# Patient Record
Sex: FEMALE | Race: BLACK OR AFRICAN AMERICAN | NOT HISPANIC OR LATINO | URBAN - METROPOLITAN AREA
[De-identification: names, ages, dates, MRNs, and addresses within clinical notes are randomized per-mention and may not be internally consistent; named-entity substitution may affect disease eponyms.]

---

## 2023-07-10 ENCOUNTER — EMERGENCY (EMERGENCY)
Facility: HOSPITAL | Age: 88
LOS: 0 days | Discharge: HOME | End: 2023-07-11
Attending: STUDENT IN AN ORGANIZED HEALTH CARE EDUCATION/TRAINING PROGRAM
Payer: MEDICARE

## 2023-07-10 VITALS
RESPIRATION RATE: 18 BRPM | SYSTOLIC BLOOD PRESSURE: 205 MMHG | WEIGHT: 179.02 LBS | OXYGEN SATURATION: 99 % | HEIGHT: 62 IN | DIASTOLIC BLOOD PRESSURE: 105 MMHG | HEART RATE: 91 BPM | TEMPERATURE: 99 F

## 2023-07-10 DIAGNOSIS — I10 ESSENTIAL (PRIMARY) HYPERTENSION: ICD-10-CM
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
## 2023-07-10 DIAGNOSIS — G89.29 OTHER CHRONIC PAIN: ICD-10-CM

## 2023-07-10 DIAGNOSIS — M25.511 PAIN IN RIGHT SHOULDER: ICD-10-CM

## 2023-07-10 DIAGNOSIS — M25.512 PAIN IN LEFT SHOULDER: ICD-10-CM

## 2023-07-10 DIAGNOSIS — M54.2 CERVICALGIA: ICD-10-CM
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
## 2023-07-10 DIAGNOSIS — H92.01 OTALGIA, RIGHT EAR: ICD-10-CM

## 2023-07-10 LAB
BASOPHILS # BLD AUTO: 0.03 K/UL — SIGNIFICANT CHANGE UP (ref 0–0.2)
BASOPHILS NFR BLD AUTO: 0.4 % — SIGNIFICANT CHANGE UP (ref 0–2)
EOSINOPHIL # BLD AUTO: 0.48 K/UL — SIGNIFICANT CHANGE UP (ref 0–0.5)
EOSINOPHIL NFR BLD AUTO: 6.8 % — HIGH (ref 0–6)
HCT VFR BLD CALC: 35.7 % — SIGNIFICANT CHANGE UP (ref 34.5–45)
HGB BLD-MCNC: 11.3 G/DL — LOW (ref 11.5–15.5)
IMM GRANULOCYTES NFR BLD AUTO: 0.3 % — SIGNIFICANT CHANGE UP (ref 0–0.9)
LYMPHOCYTES # BLD AUTO: 1.23 K/UL — SIGNIFICANT CHANGE UP (ref 1–3.3)
LYMPHOCYTES # BLD AUTO: 17.4 % — SIGNIFICANT CHANGE UP (ref 13–44)
MCHC RBC-ENTMCNC: 31.1 PG — SIGNIFICANT CHANGE UP (ref 27–34)
MCHC RBC-ENTMCNC: 31.7 G/DL — LOW (ref 32–36)
MCV RBC AUTO: 98.3 FL — SIGNIFICANT CHANGE UP (ref 80–100)
MONOCYTES # BLD AUTO: 0.5 K/UL — SIGNIFICANT CHANGE UP (ref 0–0.9)
MONOCYTES NFR BLD AUTO: 7.1 % — SIGNIFICANT CHANGE UP (ref 2–14)
NEUTROPHILS # BLD AUTO: 4.79 K/UL — SIGNIFICANT CHANGE UP (ref 1.8–7.4)
NEUTROPHILS NFR BLD AUTO: 68 % — SIGNIFICANT CHANGE UP (ref 43–77)
NRBC # BLD: 0 /100 WBCS — SIGNIFICANT CHANGE UP (ref 0–0)
PLATELET # BLD AUTO: 190 K/UL — SIGNIFICANT CHANGE UP (ref 150–400)
RBC # BLD: 3.63 M/UL — LOW (ref 3.8–5.2)
RBC # FLD: 14 % — SIGNIFICANT CHANGE UP (ref 10.3–14.5)
WBC # BLD: 7.05 K/UL — SIGNIFICANT CHANGE UP (ref 3.8–10.5)
WBC # FLD AUTO: 7.05 K/UL — SIGNIFICANT CHANGE UP (ref 3.8–10.5)

## 2023-07-10 PROCEDURE — 99285 EMERGENCY DEPT VISIT HI MDM: CPT

## 2023-07-10 RX ORDER — IBUPROFEN 200 MG
400 TABLET ORAL ONCE
Refills: 0 | Status: COMPLETED | OUTPATIENT
Start: 2023-07-10 | End: 2023-07-10

## 2023-07-10 RX ORDER — ACETAMINOPHEN 500 MG
650 TABLET ORAL ONCE
Refills: 0 | Status: COMPLETED | OUTPATIENT
Start: 2023-07-10 | End: 2023-07-10

## 2023-07-10 RX ADMIN — Medication 650 MILLIGRAM(S): at 23:33

## 2023-07-10 NOTE — ED ADULT NURSE NOTE - OBJECTIVE STATEMENT
Pt aaox3. Pt s/p fall. Pt went to the bathroom and holding on the bar and fell to the toilet seat. Pt complaint bilateral shoulder pain, bilateral knee and right hip pain. Pt denies loc. No visible fracture or laceration.

## 2023-07-10 NOTE — ED ADULT NURSE NOTE - ISOLATION TYPE:
CHW - Case Closure    This Community Health Worker spoke to patient via telephone today.   Pt/Caregiver reported: Pt stated she doesn't need any community resources at this time  Pt/Caregiver denied any additional needs at this time and agrees with episode closure at this time.  Provided patient with Community Health Worker's contact information and encouraged him/her to contact this Community Health Worker if additional needs arise.        None

## 2023-07-10 NOTE — ED ADULT NURSE NOTE - NSFALLHARMRISKINTERV_ED_ALL_ED

## 2023-07-11 VITALS — SYSTOLIC BLOOD PRESSURE: 186 MMHG | DIASTOLIC BLOOD PRESSURE: 131 MMHG

## 2023-07-11 LAB
ALBUMIN SERPL ELPH-MCNC: 2.9 G/DL — LOW (ref 3.3–5)
ALP SERPL-CCNC: 73 U/L — SIGNIFICANT CHANGE UP (ref 40–120)
ALT FLD-CCNC: 19 U/L — SIGNIFICANT CHANGE UP (ref 12–78)
ANION GAP SERPL CALC-SCNC: 4 MMOL/L — LOW (ref 5–17)
AST SERPL-CCNC: 13 U/L — LOW (ref 15–37)
BILIRUB SERPL-MCNC: 0.4 MG/DL — SIGNIFICANT CHANGE UP (ref 0.2–1.2)
BUN SERPL-MCNC: 18 MG/DL — SIGNIFICANT CHANGE UP (ref 7–23)
CALCIUM SERPL-MCNC: 9.2 MG/DL — SIGNIFICANT CHANGE UP (ref 8.5–10.1)
CHLORIDE SERPL-SCNC: 110 MMOL/L — HIGH (ref 96–108)
CO2 SERPL-SCNC: 25 MMOL/L — SIGNIFICANT CHANGE UP (ref 22–31)
CREAT SERPL-MCNC: 1.55 MG/DL — HIGH (ref 0.5–1.3)
EGFR: 31 ML/MIN/1.73M2 — LOW
GLUCOSE SERPL-MCNC: 127 MG/DL — HIGH (ref 70–99)
LIDOCAIN IGE QN: 110 U/L — SIGNIFICANT CHANGE UP (ref 73–393)
POTASSIUM SERPL-MCNC: 3.9 MMOL/L — SIGNIFICANT CHANGE UP (ref 3.5–5.3)
POTASSIUM SERPL-SCNC: 3.9 MMOL/L — SIGNIFICANT CHANGE UP (ref 3.5–5.3)
PROT SERPL-MCNC: 8.5 GM/DL — HIGH (ref 6–8.3)
SODIUM SERPL-SCNC: 139 MMOL/L — SIGNIFICANT CHANGE UP (ref 135–145)
TROPONIN I, HIGH SENSITIVITY RESULT: 18.9 NG/L — SIGNIFICANT CHANGE UP

## 2023-07-11 PROCEDURE — 70450 CT HEAD/BRAIN W/O DYE: CPT | Mod: 26,MA

## 2023-07-11 PROCEDURE — 73522 X-RAY EXAM HIPS BI 3-4 VIEWS: CPT | Mod: 26

## 2023-07-11 PROCEDURE — 73030 X-RAY EXAM OF SHOULDER: CPT | Mod: 26,50

## 2023-07-11 PROCEDURE — 71046 X-RAY EXAM CHEST 2 VIEWS: CPT | Mod: 26

## 2023-07-11 PROCEDURE — 72125 CT NECK SPINE W/O DYE: CPT | Mod: 26,MA

## 2023-07-11 PROCEDURE — 93010 ELECTROCARDIOGRAM REPORT: CPT

## 2023-07-11 RX ORDER — GABAPENTIN 400 MG/1
100 CAPSULE ORAL ONCE
Refills: 0 | Status: COMPLETED | OUTPATIENT
Start: 2023-07-11 | End: 2023-07-11

## 2023-07-11 RX ORDER — OFLOXACIN OTIC SOLUTION 3 MG/ML
5 SOLUTION/ DROPS AURICULAR (OTIC) ONCE
Refills: 0 | Status: COMPLETED | OUTPATIENT
Start: 2023-07-11 | End: 2023-07-11

## 2023-07-11 RX ORDER — GABAPENTIN 400 MG/1
1 CAPSULE ORAL
Qty: 10 | Refills: 0
Start: 2023-07-11 | End: 2023-07-15

## 2023-07-11 RX ORDER — OFLOXACIN OTIC SOLUTION 3 MG/ML
10 SOLUTION/ DROPS AURICULAR (OTIC)
Qty: 1 | Refills: 0
Start: 2023-07-11 | End: 2023-07-17

## 2023-07-11 RX ADMIN — Medication 650 MILLIGRAM(S): at 04:39

## 2023-07-11 RX ADMIN — Medication 400 MILLIGRAM(S): at 01:09

## 2023-07-11 RX ADMIN — OFLOXACIN OTIC SOLUTION 5 DROP(S): 3 SOLUTION/ DROPS AURICULAR (OTIC) at 04:54

## 2023-07-11 RX ADMIN — Medication 400 MILLIGRAM(S): at 04:40

## 2023-07-11 RX ADMIN — GABAPENTIN 100 MILLIGRAM(S): 400 CAPSULE ORAL at 03:47

## 2023-07-11 NOTE — ED PROVIDER NOTE - NSFOLLOWUPINSTRUCTIONS_ED_ALL_ED_FT
You were seen today for right ear pain - you had possible small perforation of the tympanic membrane but it was hard to assess. We have prescribed ear drops - use 10 drops once a day for 7 days. Follow up with ear, nose throat specialist and a neurologist for pain management    You had abnormal CT scan results - we recommend follow up MRI, return for any neck pain, numbness, weakness, chest pain or shortness of breath You were seen today for right ear pain - you had possible small perforation of the tympanic membrane but it was hard to assess. We have prescribed ear drops - use 10 drops once a day for 7 days. Follow up with ear, nose throat specialist and a neurologist for pain management    You had abnormal CT scan results - we recommend follow up MRI, return for any neck pain, numbness, weakness, chest pain or shortness of breath    Trial gabapentin 100mg every 12 hours for pain    continue tylenol/ alleve for pain relief     Your blood pressure was very high, monitor blood pressure and return if it is persistently elevated above 180/90

## 2023-07-11 NOTE — ED PROVIDER NOTE - OBJECTIVE STATEMENT
89F pmhx arthritis but no reported other pmhx who presents with complaint of right ear pain ongoing x several months sometimes radiating to right scalp, sharp, no clear provoking/ alleviating factors, no direct trauma to ear or fevers. No eye pain or vision changes. Patient reports fall off toilet 3 days ago which caused b/l shoulder pain but denies head injury or LOC. Notes chronic neck pain, not new. 89F pmhx arthritis, HTN on losartan who presents with complaint of right ear pain ongoing x several months sometimes radiating to right scalp, sharp, no clear provoking/ alleviating factors, no direct trauma to ear or fevers. No eye pain or vision changes. Patient reports fall off toilet 3 days ago which caused b/l shoulder pain but denies head injury or LOC. Notes chronic neck pain, not new. Denies numbness/ weakness. Patient reports taking alleve/tylenol without relief of symptoms

## 2023-07-11 NOTE — ED PROVIDER NOTE - CARE PROVIDER_API CALL
Quirino Vernon  Otolaryngology  [address], [place] [address]  Phone: [phone]  Fax: [phone]  Follow Up Time: 4-6 Days    Kirsten Clinton  Neurology  [address] [place] [address]  Phone: [phone]  Fax: [phone]  Follow Up Time:

## 2023-07-11 NOTE — ED PROVIDER NOTE - PATIENT PORTAL LINK FT
You can access the FollowMyHealth Patient Portal offered by [facility] by registering at the following website: [email or web address]. By joining [employer]’s FollowMyHealth portal, you will also be able to view your health information using other applications (apps) compatible with our system. You can access the FollowMyHealth Patient Portal offered by [facility] by registering at the following website: [email or web address]. By joining [employer]’s FollowMyHealth portal, you will also be able to view your health information using other applications (apps) compatible with our system.

## 2023-07-11 NOTE — ED PROVIDER NOTE - PROGRESS NOTE DETAILS
Clinton DO: pt reassessed, results explained to pt and family, possible ? small perf of TM right ear, no midline spinal ttp, discussed results of ct and does not appear to correlate to exam, pt and family do not want to stay for MRI - state symptoms x months, will dc with supportive care and neuro follow up Clinton DO: pt refusing to stay for medication/ monitoring despite elevated bp =- pt reports she did not take her losartan today and reports she will take it at home, pt and family requesting to leave, return precautions discussed Clinton DO: pt refusing to stay for medication/ monitoring despite elevated bp - pt reports she did not take her losartan today and reports she will take it at home, pt and family requesting to leave, return precautions discussed, they report they will take her missed losartan dose as soon as she is home

## 2023-07-11 NOTE — ED PROVIDER NOTE - NSPTACCESSSVCSAPPTDETAILS_ED_ALL_ED_FT
ENT as soon as available appt for right ear pain , neuro for follow up ? trigeminal neuralgia/ headaches.

## 2023-07-11 NOTE — ED PROVIDER NOTE - WR ORDER NAME 3
Xray Shoulder 2 Views, Bilateral
4 - Moderately severe disability. Unable to own bodily needs without assistance and unable to walk unassisted

## 2023-07-11 NOTE — ED PROVIDER NOTE - CLINICAL SUMMARY MEDICAL DECISION MAKING FREE TEXT BOX
89F pmhx arthritis, HTN on losartan who presents with complaint of right ear pain ongoing x several months sometimes radiating to right scalp, sharp, no clear provoking/ alleviating factors, no direct trauma to ear or fevers. She reports fall off toilet / onto toilet onto buttocks and hitting shoulders but denies significant head trauma. Right ear pain radiates to right side of head     - symptoms of fall and right ear pain not related - patient reports right ear pain sharp and goes to right side of head x months, denies any significant head injury, reports muscle aches from fall onto buttocks on toilet 3 days ago - no clear cause of pain -possible TMJ pain, possible trigeminal neuralgia, small area of possible TM perforation - will trial otic ofloxacin gtts for TM perforation , outpt ENT follow up advised and neuro follow up advised to eval for possible trigeminal neuralgia. Due to age and htn, will ct brain rule out ICH, ct cervical spine r/.o fx, pt with normal ROM neck and no appreciable midline spinal ttp. xray b/l shoulders, chest, hips rule out traumatic injury from fall. labs to eval for anemia, leukocytosis, eval for metabolic derangements, eval renal fcn, screening ekg/ troponin r.o atypical acs though no active chest pain so lower suspicion

## 2023-07-11 NOTE — ED PROVIDER NOTE - PHYSICAL EXAMINATION
Gen: AOX3, NAD  Head: NCAT  ENT: Airway patent, moist mucous membranes, nasal passageways clear, no pharyngeal erythema or exudates, uvula midline, no cervical lymphadenopathy, + right TM with possible medial small area of perforation , no purulence or surrounding erythema,, L TM wnl.   Cardiac: Normal rate, normal rhythm  Respiratory: Lungs CTA B/L  Gastrointestinal:  Abdomen soft, nontender, nondistended, no rebound  MSK: No gross abnormalities, FROM of all four extremities, + ttp b/l shoulder area, no clavicular ttp, no midline c-t-l spine ttp.   HEME: Extremities warm and well perfused   Skin: No rashes, no lesions  Neuro: No gross neurologic deficits, CN II-XII intact, no facial asymmetry, no dysarthria,  strength equal in all four extremities, normal sensation throughout

## 2023-08-17 PROBLEM — I10 ESSENTIAL (PRIMARY) HYPERTENSION: Chronic | Status: ACTIVE | Noted: 2023-07-11

## 2023-08-29 PROBLEM — Z00.00 ENCOUNTER FOR PREVENTIVE HEALTH EXAMINATION: Status: ACTIVE | Noted: 2023-08-29

## 2023-08-30 ENCOUNTER — APPOINTMENT (OUTPATIENT)
Dept: OTOLARYNGOLOGY | Facility: CLINIC | Age: 88
End: 2023-08-30
Payer: MEDICARE

## 2023-08-30 ENCOUNTER — NON-APPOINTMENT (OUTPATIENT)
Age: 88
End: 2023-08-30

## 2023-08-30 VITALS — BODY MASS INDEX: 32.76 KG/M2 | HEIGHT: 62 IN | WEIGHT: 178 LBS | HEART RATE: 93 BPM

## 2023-08-30 DIAGNOSIS — J31.0 CHRONIC RHINITIS: ICD-10-CM

## 2023-08-30 DIAGNOSIS — M26.621 ARTHRALGIA OF RIGHT TEMPOROMANDIBULAR JOINT: ICD-10-CM

## 2023-08-30 DIAGNOSIS — H92.01 OTALGIA, RIGHT EAR: ICD-10-CM

## 2023-08-30 DIAGNOSIS — Z78.9 OTHER SPECIFIED HEALTH STATUS: ICD-10-CM

## 2023-08-30 DIAGNOSIS — H90.3 SENSORINEURAL HEARING LOSS, BILATERAL: ICD-10-CM

## 2023-08-30 DIAGNOSIS — Z87.39 PERSONAL HISTORY OF OTHER DISEASES OF THE MUSCULOSKELETAL SYSTEM AND CONNECTIVE TISSUE: ICD-10-CM

## 2023-08-30 PROCEDURE — 92552 PURE TONE AUDIOMETRY AIR: CPT

## 2023-08-30 PROCEDURE — 92567 TYMPANOMETRY: CPT

## 2023-08-30 PROCEDURE — 99203 OFFICE O/P NEW LOW 30 MIN: CPT

## 2023-08-30 RX ORDER — LOSARTAN POTASSIUM 100 MG/1
100 TABLET, FILM COATED ORAL
Refills: 0 | Status: ACTIVE | COMMUNITY

## 2023-08-30 RX ORDER — GABAPENTIN 100 MG/1
100 CAPSULE ORAL
Refills: 0 | Status: ACTIVE | COMMUNITY

## 2023-08-30 NOTE — HISTORY OF PRESENT ILLNESS
[de-identified] : STEPHEN CLARKE is a 89 year old F who presents today for right ear pain that sometimes radiates to the right side of scalp that first started 2 years ago but started worsening recently.  Pt also has hearing loss. Pt went to [facility] 3 days after falling and had CAT scans of head, shoulder and Cervical spine.  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

## 2023-08-30 NOTE — CONSULT LETTER
[Dear  ___] : Dear  [unfilled], [Consult Letter:] : I had the pleasure of evaluating your patient, [unfilled]. [Consult Closing:] : Thank you very much for allowing me to participate in the care of this patient.  If you have any questions, please do not hesitate to contact me. [Sincerely,] : Sincerely, [FreeTextEntry3] : Dr. Claus Abarca

## 2023-08-30 NOTE — PHYSICAL EXAM
[Midline] : trachea located in midline position [Normal] : the left mastoid was normal [Hearing Loss Right Only] : diminished [Hearing Loss Left Only] : diminished [de-identified] : tenderness [FreeTextEntry8] : small Lucanar pinhole [FreeTextEntry9] : Jose  [de-identified] : Inflamed  [de-identified] : Dentures

## 2023-08-30 NOTE — END OF VISIT
[FreeTextEntry3] : "I, Faustino Powell, personally scribed the services dictated to me by Dr. Claus Abarca MD in this documentation on 08/30/2023 "   "I Dr. Claus Abarca MD, personally performed the services described in this documentation on 08/30/2023 for the patient as scribed by Faustino Powell in my presence. I have reviewed and verified that all the information is accurate and true."